# Patient Record
Sex: FEMALE | Race: WHITE
[De-identification: names, ages, dates, MRNs, and addresses within clinical notes are randomized per-mention and may not be internally consistent; named-entity substitution may affect disease eponyms.]

---

## 2017-12-05 ENCOUNTER — HOSPITAL ENCOUNTER (EMERGENCY)
Dept: HOSPITAL 56 - MW.ED | Age: 22
Discharge: HOME | End: 2017-12-05
Payer: COMMERCIAL

## 2017-12-05 DIAGNOSIS — B09: Primary | ICD-10-CM

## 2017-12-05 DIAGNOSIS — Z88.1: ICD-10-CM

## 2017-12-05 PROCEDURE — 99283 EMERGENCY DEPT VISIT LOW MDM: CPT

## 2017-12-05 NOTE — EDM.PDOC
ED HPI GENERAL MEDICAL PROBLEM





- General


Chief Complaint: Skin Complaint


Stated Complaint: RASH


Time Seen by Provider: 12/05/17 16:33


Source of Information: Reports: Patient


History Limitations: Reports: No Limitations





- History of Present Illness


INITIAL COMMENTS - FREE TEXT/NARRATIVE: 


History of present illness:


[]Patient had a fever 3 days ago today broke out in a diffuse rash burning all 

over her body. She has no new medications and has no new exposures. She denies 

any itching, coughing shortness of breath difficulty swallowing or any other 

symptoms.





Review of systems: 


As per history of present illness and below otherwise all systems reviewed and 

negative.





Past medical history: 


As per history of present illness and as reviewed below otherwise 

noncontributory.





Surgical history: 


As per history of present illness and as reviewed below otherwise 

noncontributory.





Social history: 


No reported history of drug or alcohol abuse.





Family history: 


As per history of present illness and as reviewed below otherwise 

noncontributory.





Physical exam:


General: Well developed, well nourished in NAD


HEENT: Atraumatic, normocephalic, pupils reactive, negative for conjunctival 

pallor or scleral icterus, mucous membranes moist, throat clear, neck supple, 

nontender, trachea midline.


Lungs: Clear to auscultation, breath sounds equal bilaterally, chest nontender.


Heart: S1S2, regular, negative for clicks, rubs, or JVD.


Abdomen: Soft, nondistended, nontender. Negative for masses or 

hepatosplenomegaly. Negative for costovertebral tenderness.


Pelvis: Stable nontender.


Genitourinary: Deferred.


Rectal: Deferred.


Extremities: Atraumatic, negative for cords or calf pain. Neurovascular 

unremarkable.


Neuro: Awake, alert, oriented. Cranial nerves II through XII unremarkable. 

Cerebellum unremarkable. Motor and sensory unremarkable throughout. Exam 

nonfocal.


Skin: Patient has a diffuse mildly erythematous macular rash body there is no 

herald patch noted.





Diagnostics:


[]





Therapeutics:


[]





Impression: 


[]Viral exanthem





Plan:


[]Benadryl by mouth for symptoms follow-up with PMD





Definitive disposition and diagnosis as appropriate pending reevaluation and 

review of above.








- Related Data


 Allergies











Allergy/AdvReac Type Severity Reaction Status Date / Time


 


cefaclor [From [facility]] Allergy  Hives Verified 12/05/17 16:41











Home Meds: 


 Home Meds





. [No Known Home Meds]  12/05/17 [History]











Past Medical History


HEENT History: Reports: None


Cardiovascular History: Reports: None


Respiratory History: Reports: None


Gastrointestinal History: Reports: Other (See Below)


Other Gastrointestinal History: Reflux, hernia


Genitourinary History: Reports: None


OB/GYN History: Reports: None


Musculoskeletal History: Reports: None


Neurological History: Reports: None


Psychiatric History: Reports: None


Endocrine/Metabolic History: Reports: Other (See Below)


Other Endocrine/Metabolic History: cyst removal right arm


Hematologic History: Reports: None


Immunologic History: Reports: None


Oncologic (Cancer) History: Reports: None


Dermatologic History: Reports: None





- Infectious Disease History


Infectious Disease History: Reports: Chicken Pox





- Past Surgical History


Head Surgeries/Procedures: Reports: None


HEENT Surgical History: Reports: None


Cardiovascular Surgical History: Reports: None


Respiratory Surgical History: Reports: None


GI Surgical History: Reports: None


Female  Surgical History: Reports: None


Endocrine Surgical History: Reports: None


Neurological Surgical History: Reports: None


Musculoskeletal Surgical History: Reports: None


Oncologic Surgical History: Reports: None


Dermatological Surgical History: Reports: None





Social & Family History





- Family History


Family Medical History: Noncontributory





- Tobacco Use


Smoking Status *Q: Never Smoker





- Caffeine Use


Caffeine Use: Reports: Soda





- Recreational Drug Use


Recreational Drug Use: No





ED ROS GENERAL





- Review of Systems


Review Of Systems: See Below (See history of present illness)





ED EXAM, SKIN/RASH


Exam: See Below (See history of present illness)





Course





- Vital Signs


Last Recorded V/S: 


 Last Vital Signs











Temp  97.2 F   12/05/17 16:42


 


Pulse  100   12/05/17 16:42


 


Resp  18   12/05/17 16:42


 


BP  141/78 H  12/05/17 16:42


 


Pulse Ox  98   12/05/17 16:42














- Orders/Labs/Meds


Meds: 


Medications














Discontinued Medications














Generic Name Dose Route Start Last Admin





  Trade Name [place]  PRN Reason Stop Dose Admin


 


Diphenhydramine HCl  25 mg  12/05/17 17:15  





  Benadryl  PO  12/05/17 17:16  





  ONETIME ONE   














Departure





- Departure


Time of Disposition: 17:21


Disposition: Home, Self-Care 01


Condition: Good


Clinical Impression: 


 Viral exanthem








- Discharge Information


Instructions:  Rash


Referrals: 


PCP,None [Primary Care Provider] - 


Forms:  ED Department Discharge

## 2018-01-06 ENCOUNTER — HOSPITAL ENCOUNTER (EMERGENCY)
Dept: HOSPITAL 56 - MW.ED | Age: 23
Discharge: HOME | End: 2018-01-06
Payer: MEDICAID

## 2018-01-06 DIAGNOSIS — Z88.1: ICD-10-CM

## 2018-01-06 DIAGNOSIS — R19.7: Primary | ICD-10-CM

## 2018-01-06 LAB
CHLORIDE SERPL-SCNC: 113 MMOL/L (ref 98–110)
SODIUM SERPL-SCNC: 140 MMOL/L (ref 136–146)

## 2018-01-06 PROCEDURE — 87046 STOOL CULTR AEROBIC BACT EA: CPT

## 2018-01-06 PROCEDURE — 80053 COMPREHEN METABOLIC PANEL: CPT

## 2018-01-06 PROCEDURE — 96375 TX/PRO/DX INJ NEW DRUG ADDON: CPT

## 2018-01-06 PROCEDURE — 83690 ASSAY OF LIPASE: CPT

## 2018-01-06 PROCEDURE — 96374 THER/PROPH/DIAG INJ IV PUSH: CPT

## 2018-01-06 PROCEDURE — 85025 COMPLETE CBC W/AUTO DIFF WBC: CPT

## 2018-01-06 PROCEDURE — 99284 EMERGENCY DEPT VISIT MOD MDM: CPT

## 2018-01-06 PROCEDURE — 36415 COLL VENOUS BLD VENIPUNCTURE: CPT

## 2018-01-06 PROCEDURE — 96361 HYDRATE IV INFUSION ADD-ON: CPT

## 2018-01-06 NOTE — EDM.PDOC
ED HPI GENERAL MEDICAL PROBLEM





- General


Chief Complaint: Abdominal Pain


Stated Complaint: ABDOMINAL PAIN


Time Seen by Provider: 01/06/18 10:47


Source of Information: Reports: Patient


History Limitations: Reports: No Limitations





- History of Present Illness


INITIAL COMMENTS - FREE TEXT/NARRATIVE: 


History of present illness:


[]Patient has had 3 days of frequent diarrhea and upper abdominal pain. Her 

pain feels like a knot in her epigastrium and is alleviated briefly after her 

bowel movements returns right away. He denies any exposures she has had 

episodes of diarrhea that lasted a few hours but never 3 days in a row. She 

denies any blood or mucus in her stool and states that is watery patient is 

nauseated and has had one episode of vomiting. Eyes any fevers.





Review of systems: 


As per history of present illness and below otherwise all systems reviewed and 

negative.





Past medical history: 


As per history of present illness and as reviewed below otherwise 

noncontributory.





Surgical history: 


As per history of present illness and as reviewed below otherwise 

noncontributory.





Social history: 


No reported history of drug or alcohol abuse.





Family history: 


As per history of present illness and as reviewed below otherwise 

noncontributory.





Physical exam:


General: Well developed, well nourished in NAD


HEENT: Atraumatic, normocephalic, pupils reactive, negative for conjunctival 

pallor or scleral icterus, mucous membranes moist, throat clear, neck supple, 

nontender, trachea midline.


Lungs: Clear to auscultation, breath sounds equal bilaterally, chest nontender.


Heart: S1S2, regular, negative for clicks, rubs, or JVD.


Abdomen: Soft, nondistended, nontender. Negative for masses or 

hepatosplenomegaly. Negative for costovertebral tenderness.


Pelvis: Stable nontender.


Genitourinary: Deferred.


Rectal: Deferred.


Extremities: Atraumatic, negative for cords or calf pain. Neurovascular 

unremarkable.


Neuro: Awake, alert, oriented. Cranial nerves II through XII unremarkable. 

Cerebellum unremarkable. Motor and sensory unremarkable throughout. Exam 

nonfocal.





Diagnostics:


[]Labs and stool sent for culture





Therapeutics:


[]Zofran IV hydrated with improvement of her symptoms





Impression: 


[]Diarrhea





Plan:


[]Imodium, Zofran for nausea follow-up with PMD return if symptoms worsen or 

change





Definitive disposition and diagnosis as appropriate pending reevaluation and 

review of above.





  ** Bilateral Upper Abdomen


Pain Score (Numeric/FACES): 8





- Related Data


 Allergies











Allergy/AdvReac Type Severity Reaction Status Date / Time


 


cefaclor [From Ceclor] Allergy  Hives Verified 01/06/18 11:03











Home Meds: 


 Home Meds





Ondansetron HCl [Zofran] 4 mg PO Q6HR PRN #12 tablet 01/06/18 [Rx]











Past Medical History


HEENT History: Reports: None


Cardiovascular History: Reports: None


Respiratory History: Reports: None


Gastrointestinal History: Reports: Other (See Below)


Other Gastrointestinal History: Reflux, hernia


Genitourinary History: Reports: None


OB/GYN History: Reports: None


Musculoskeletal History: Reports: None


Neurological History: Reports: None


Psychiatric History: Reports: None


Endocrine/Metabolic History: Reports: Other (See Below)


Other Endocrine/Metabolic History: cyst removal right arm


Hematologic History: Reports: None


Immunologic History: Reports: None


Oncologic (Cancer) History: Reports: None


Dermatologic History: Reports: None





- Infectious Disease History


Infectious Disease History: Reports: Chicken Pox





- Past Surgical History


Head Surgeries/Procedures: Reports: None


HEENT Surgical History: Reports: None


Cardiovascular Surgical History: Reports: None


Respiratory Surgical History: Reports: None


GI Surgical History: Reports: None


Female  Surgical History: Reports: None


Endocrine Surgical History: Reports: None


Neurological Surgical History: Reports: None


Musculoskeletal Surgical History: Reports: None


Oncologic Surgical History: Reports: None


Dermatological Surgical History: Reports: None





Social & Family History





- Family History


Family Medical History: Noncontributory





- Tobacco Use


Smoking Status *Q: Never Smoker





- Caffeine Use


Caffeine Use: Reports: Soda





- Recreational Drug Use


Recreational Drug Use: No





ED ROS GENERAL





- Review of Systems


Review Of Systems: See Below (See history of present illness)





ED EXAM, GI/ABD





- Physical Exam


Exam: See Below (See history of present illness)





Course





- Vital Signs


Last Recorded V/S: 





 Last Vital Signs











Temp  97.7 F   01/06/18 11:03


 


Pulse  109 H  01/06/18 11:03


 


Resp  18   01/06/18 11:03


 


BP  157/110 H  01/06/18 11:03


 


Pulse Ox  98   01/06/18 11:03














- Orders/Labs/Meds


Orders: 





 Active Orders 24 hr











 Category Date Time Status


 


 CULTURE STOOL + CAMPY+SHIGATOX [RM] Stat Lab  01/06/18 12:11 Results


 


 Sodium Chloride 0.9% [Saline Flush] Med  01/06/18 11:26 Active





 10 ml FLUSH ASDIRECTED PRN   


 


 Sodium Chloride 0.9% [Saline Flush] Med  01/06/18 11:26 Active





 2.5 ml FLUSH ASDIRECTED PRN   


 


 Saline Lock Insert [OM.PC] Stat Oth  01/06/18 11:25 Ordered








 Medication Orders





Sodium Chloride (Saline Flush)  10 ml FLUSH ASDIRECTED PRN


   PRN Reason: Keep Vein Open


   Last Admin: 01/06/18 11:48  Dose: 10 ml


Sodium Chloride (Saline Flush)  2.5 ml FLUSH ASDIRECTED PRN


   PRN Reason: Keep Vein Open


   Last Admin: 01/06/18 11:47  Dose: 2.5 ml








Labs: 





 Laboratory Tests











  01/06/18 01/06/18 Range/Units





  11:42 11:42 


 


WBC  9.03   (4.0-11.0)  K/uL


 


RBC  4.86   (4.30-5.90)  M/uL


 


Hgb  15.2   (12.0-16.0)  g/dL


 


Hct  43.5   (36.0-46.0)  %


 


MCV  89.5   (80.0-98.0)  fL


 


MCH  31.3   (27.0-32.0)  pg


 


MCHC  34.9   (31.0-37.0)  g/dL


 


RDW Std Deviation  45.6   (28.0-62.0)  fl


 


RDW Coeff of Anmol  14   (11.0-15.0)  %


 


Plt Count  216   (150-400)  K/uL


 


MPV  9.70   (7.40-12.00)  fL


 


Neut % (Auto)  63.8   (48.0-80.0)  %


 


Lymph % (Auto)  25.4   (16.0-40.0)  %


 


Mono % (Auto)  7.1   (0.0-15.0)  %


 


Eos % (Auto)  3.5   (0.0-7.0)  %


 


Baso % (Auto)  0.2   (0.0-1.5)  %


 


Neut # (Auto)  5.8 H   (1.4-5.7)  K/uL


 


Lymph # (Auto)  2.3   (0.6-2.4)  K/uL


 


Mono # (Auto)  0.6   (0.0-0.8)  K/uL


 


Eos # (Auto)  0.3   (0.0-0.7)  K/uL


 


Baso # (Auto)  0.0   (0.0-0.1)  K/uL


 


Nucleated RBC %  0.0   /100WBC


 


Nucleated RBCs #  0   K/uL


 


Sodium   140  (136-146)  mmol/L


 


Potassium   3.7  (3.5-5.1)  mmol/L


 


Chloride   113 H  ()  mmol/L


 


Carbon Dioxide   17 L  (21-31)  mmol/L


 


BUN   12  (6.0-23.0)  mg/dL


 


Creatinine   0.8  (0.6-1.5)  mg/dL


 


Est Cr Clr Drug Dosing   97.57  mL/min


 


Estimated GFR (MDRD)   > 60.0  ml/min


 


Glucose   106  ()  mg/dL


 


Calcium   9.5  (8.8-10.8)  mg/dL


 


Total Bilirubin   0.8  (0.1-1.5)  mg/dL


 


AST   31  (5-40)  IU/L


 


ALT   42  (8-54)  IU/L


 


Alkaline Phosphatase   72  ()  


 


Total Protein   8.3 H  (6.0-8.0)  g/dL


 


Albumin   4.9  (3.5-5.0)  g/dL


 


Globulin   3.4  (2.0-3.5)  g/dL


 


Albumin/Globulin Ratio   1.4  (1.3-2.8)  


 


Lipase   17  (7-80)  U/L











Meds: 





Medications











Generic Name Dose Route Start Last Admin





  Trade Name Freq  PRN Reason Stop Dose Admin


 


Sodium Chloride  10 ml  01/06/18 11:26 01/06/18 11:48





  Saline Flush  FLUSH   10 ml





  ASDIRECTED PRN   Administration





  Keep Vein Open   


 


Sodium Chloride  2.5 ml  01/06/18 11:26  01/06/18 11:47





  Saline Flush  FLUSH   2.5 ml





  ASDIRECTED PRN   Administration





  Keep Vein Open   














Discontinued Medications














Generic Name Dose Route Start Last Admin





  Trade Name Freq  PRN Reason Stop Dose Admin


 


Sodium Chloride  1,000 mls @ 999 mls/hr  01/06/18 11:26  01/06/18 11:46





  Normal Saline  IV  01/06/18 12:26  999 mls/hr





  .Bolus ONE   Administration


 


Morphine Sulfate  4 mg  01/06/18 11:26 01/06/18 11:47





  Morphine  IVPUSH  01/06/18 11:27  4 mg





  ONETIME ONE   Administration


 


Ondansetron HCl  4 mg  01/06/18 11:26  01/06/18 11:46





  Zofran  IVPUSH  01/06/18 11:27  4 mg





  ONETIME ONE   Administration














Departure





- Departure


Time of Disposition: 12:42


Disposition: Home, Self-Care 01


Condition: Good


Clinical Impression: 


Diarrhea


Qualifiers:


 Diarrhea type: unspecified type Qualified Code(s): R19.7 - Diarrhea, 

unspecified








- Discharge Information


Prescriptions: 


Ondansetron HCl [Zofran] 4 mg PO Q6HR PRN #12 tablet


 PRN Reason: Nausea


Referrals: 


PCP,None [Primary Care Provider] - 


Additional Instructions: 


The following information is given to patients seen in the emergency department 

who are being discharged to home. This information is to outline your options 

for follow-up care. We provide all patients seen in our emergency department 

with a follow-up referral.





The need for follow-up, as well as the timing and circumstances, are variable 

depending upon the specifics of your emergency department visit.





If you don't have a primary care physician on staff, we will provide you with a 

referral. We always advise you to contact your personal physician following an 

emergency department visit to inform them of the circumstance of the visit and 

for follow-up with them and/or the need for any referrals to a consulting 

specialist.





The emergency department will also refer you to a specialist when appropriate. 

This referral assures that you have the opportunity for follow-up care with a 

specialist. All of these measure are taken in an effort to provide you with 

optimal care, which includes your follow-up.





Under all circumstances we always encourage you to contact your private 

physician who remains a resource for coordinating your care. When calling for 

follow-up care, please make the office aware that this follow-up is from your 

recent emergency room visit. If for any reason you are refused follow-up, 

please contact the [facility] Emergency 

Department at [phone] and asked to speak to the emergency department 

charge nurse.





Use over-the-counter Imodium as directed, Zofran for nausea, increase fluids as 

tolerated follow-up with primary care or return to ER if symptoms worsen or 

change.





[facility]


Primary Care


[address] [place] [id number]


Phone: [phone]


Fax: [phone]





- My Orders


Last 24 Hours: 





My Active Orders





01/06/18 11:25


Saline Lock Insert [OM.PC] Stat 





01/06/18 11:26


Sodium Chloride 0.9% [Saline Flush]   10 ml FLUSH ASDIRECTED PRN 


Sodium Chloride 0.9% [Saline Flush]   2.5 ml FLUSH ASDIRECTED PRN 





01/06/18 12:11


CULTURE STOOL + CAMPY+SHIGATOX [RM] Stat 














- Assessment/Plan


Last 24 Hours: 





My Active Orders





01/06/18 11:25


Saline Lock Insert [OM.PC] Stat 





01/06/18 11:26


Sodium Chloride 0.9% [Saline Flush]   10 ml FLUSH ASDIRECTED PRN 


Sodium Chloride 0.9% [Saline Flush]   2.5 ml FLUSH ASDIRECTED PRN 





01/06/18 12:11


CULTURE STOOL + CAMPY+SHIGATOX [RM] Stat